# Patient Record
(demographics unavailable — no encounter records)

---

## 2025-02-10 NOTE — HISTORY OF PRESENT ILLNESS
[de-identified] : Patient here for Anxiety follow up.  [FreeTextEntry6] : Meds:  zoloft 100 mg No side effects on current medication. past month was the 2 year anniversary of friends death and parents announced that they were  on the same day.  so was very stressful for patient.   So February was a rough month - was both more anxious and depressed.   Hasn't considered restarting therapy Starting to feel a little better with mood and anxiety in the past week  Academics: 2nd year at [employer]. Grades have been good.   Appetite is better  Sleep - still has a lot of trouble staying asleep, wakes up at night - last night couldn't sleep at all.   Still feels tired all of the time Denies suicidal ideation - no current plans or attempts. Denies other substance use (alcohol, drugs, tobacco). No longer seeing therapist - doing ok without therapy last bloodwork showed anemia again - not on iron since last labs.  feels tired

## 2025-02-10 NOTE — DISCUSSION/SUMMARY
[FreeTextEntry1] : PHQ-9 stable at 14 Assessment of suicide risk performed - no risk Observation for suicide risk Discussion of goals, options, limitations and risks of therapy  Meds: Continue zoloft 100 mg - patient does not want to change meds feels that things are going back to where they were before things got worse last month Discussed thinking about restarting therapy with family stress going on, but patient thinks she's ok for now Next Visit:  3 months for med check and well check

## 2025-02-10 NOTE — HISTORY OF PRESENT ILLNESS
[de-identified] : Patient here for Anxiety follow up.  [FreeTextEntry6] : Meds:  zoloft 100 mg No side effects on current medication. past month was the 2 year anniversary of friends death and parents announced that they were  on the same day.  so was very stressful for patient.   So February was a rough month - was both more anxious and depressed.   Hasn't considered restarting therapy Starting to feel a little better with mood and anxiety in the past week  Academics: 2nd year at [employer]. Grades have been good.   Appetite is better  Sleep - still has a lot of trouble staying asleep, wakes up at night - last night couldn't sleep at all.   Still feels tired all of the time Denies suicidal ideation - no current plans or attempts. Denies other substance use (alcohol, drugs, tobacco). No longer seeing therapist - doing ok without therapy last bloodwork showed anemia again - not on iron since last labs.  feels tired

## 2025-06-02 NOTE — DISCUSSION/SUMMARY
[FreeTextEntry1] : PHQ-9 stable at 13 Assessment of suicide risk performed - no risk Observation for suicide risk Discussion of goals, options, limitations and risks of therapy  Meds: Continue zoloft 100 mg Discussed importance of taking iron daily with vitamin C to see if that helps with feelings of fatigue- to take when taking zoloft because she remembers to take that daily.   repeat anemia labs ordered to be done before next visit/[facility] Urine pregnancy test negative Next Visit:  3 months for med check and well check, sooner if symptoms worsening

## 2025-06-02 NOTE — HISTORY OF PRESENT ILLNESS
[de-identified] : Patient here for Anxiety follow up.  [FreeTextEntry6] : Meds:  zoloft 100 mg No side effects on current medication. Mood and anxiety have been stable Academics: Finished 2nd year at [employer]. Graduated.  Will work as a   Appetite is better  Sleep - has been better, still some trouble falling asleep and Still feels tired all of the time.   started iron gummies since last bloodwork but not consistently because not remembering to take daily Denies suicidal ideation - no current plans or attempts. Denies other substance use (alcohol, drugs, tobacco). No longer seeing therapist - feels doing ok without therapy Also period is 9 days late - had finals and was also sick, but also sexually active

## 2025-06-02 NOTE — DISCUSSION/SUMMARY
[FreeTextEntry1] : PHQ-9 stable at 13 Assessment of suicide risk performed - no risk Observation for suicide risk Discussion of goals, options, limitations and risks of therapy  Meds: Continue zoloft 100 mg Discussed importance of taking iron daily with vitamin C to see if that helps with feelings of fatigue- to take when taking zoloft because she remembers to take that daily.   repeat anemia labs ordered to be done before next visit/[facility] Urine pregnancy test negative Next Visit:  3 months for med check and well check, sooner if symptoms worsening

## 2025-06-02 NOTE — HISTORY OF PRESENT ILLNESS
[de-identified] : Patient here for Anxiety follow up.  [FreeTextEntry6] : Meds:  zoloft 100 mg No side effects on current medication. Mood and anxiety have been stable Academics: Finished 2nd year at [employer]. Graduated.  Will work as a   Appetite is better  Sleep - has been better, still some trouble falling asleep and Still feels tired all of the time.   started iron gummies since last bloodwork but not consistently because not remembering to take daily Denies suicidal ideation - no current plans or attempts. Denies other substance use (alcohol, drugs, tobacco). No longer seeing therapist - feels doing ok without therapy Also period is 9 days late - had finals and was also sick, but also sexually active

## 2025-07-17 NOTE — HISTORY OF PRESENT ILLNESS
[Y] : Patient is sexually active [N] : Patient denies prior pregnancies [FreeTextEntry1] : 20-year-old female here today for well women exam. Has been on sprintec for about 2 years, tolerating well. Denies side effects. Sexually active with same male partner, utilizes condoms however notes not every time. Notes recent menses was late, did miss one pill however notes this is not typically for her. Notes vaginal discharge that will sometimes be yellow.  [LMPDate] : 6/18/25 [PGHxTotal] : 0

## 2025-07-17 NOTE — PLAN
[FreeTextEntry1] : Normal CBE, encouraged breast awareness. Normal external genital exam. Given patient reported vaginal discharge vaginitis collected, further management pending results. Recommend Gardasil vaccine, brochure provided. Reviewed potential red flag side effects to COCs, no contraindications to continuing. Follow up in 1year or sooner as needed.

## 2025-07-17 NOTE — PHYSICAL EXAM
[Appropriately responsive] : appropriately responsive [Alert] : alert [No Acute Distress] : no acute distress [No Lymphadenopathy] : no lymphadenopathy [Soft] : soft [Non-tender] : non-tender [Non-distended] : non-distended [No Lesions] : no lesions [No Mass] : no mass [Oriented x3] : oriented x3 [Examination Of The Breasts] : a normal appearance [No Masses] : no breast masses were palpable [Normal] : normal external genitalia